# Patient Record
Sex: MALE | Race: BLACK OR AFRICAN AMERICAN | URBAN - METROPOLITAN AREA
[De-identification: names, ages, dates, MRNs, and addresses within clinical notes are randomized per-mention and may not be internally consistent; named-entity substitution may affect disease eponyms.]

---

## 2017-01-10 ENCOUNTER — OFFICE VISIT (OUTPATIENT)
Dept: FAMILY MEDICINE CLINIC | Facility: CLINIC | Age: 32
End: 2017-01-10

## 2017-01-10 VITALS
DIASTOLIC BLOOD PRESSURE: 64 MMHG | TEMPERATURE: 98 F | SYSTOLIC BLOOD PRESSURE: 108 MMHG | RESPIRATION RATE: 12 BRPM | OXYGEN SATURATION: 98 % | WEIGHT: 190 LBS | HEART RATE: 64 BPM

## 2017-01-10 DIAGNOSIS — M54.50 BILATERAL LOW BACK PAIN WITHOUT SCIATICA, UNSPECIFIED CHRONICITY: ICD-10-CM

## 2017-01-10 DIAGNOSIS — L64.9 MALE PATTERN BALDNESS: ICD-10-CM

## 2017-01-10 DIAGNOSIS — E78.2 MIXED HYPERLIPIDEMIA: ICD-10-CM

## 2017-01-10 DIAGNOSIS — R74.8 LOW SERUM HDL: Primary | ICD-10-CM

## 2017-01-10 DIAGNOSIS — B35.3 TINEA PEDIS OF LEFT FOOT: ICD-10-CM

## 2017-01-10 PROCEDURE — 99214 OFFICE O/P EST MOD 30 MIN: CPT | Performed by: FAMILY MEDICINE

## 2017-01-10 RX ORDER — OMEGA-3-ACID ETHYL ESTERS 1 G/1
2 CAPSULE, LIQUID FILLED ORAL 2 TIMES DAILY
Qty: 120 CAPSULE | Refills: 12 | Status: SHIPPED | OUTPATIENT
Start: 2017-01-10 | End: 2017-02-09

## 2017-01-10 RX ORDER — KETOCONAZOLE 20 MG/G
1 CREAM TOPICAL DAILY
Qty: 60 G | Refills: 12 | Status: SHIPPED | OUTPATIENT
Start: 2017-01-10

## 2017-01-10 RX ORDER — ROSUVASTATIN CALCIUM 20 MG/1
20 TABLET, COATED ORAL NIGHTLY
Qty: 30 TABLET | Refills: 3 | Status: SHIPPED | OUTPATIENT
Start: 2017-01-10 | End: 2017-05-12 | Stop reason: ALTCHOICE

## 2017-01-10 NOTE — PROGRESS NOTES
CHIEF COMPLAINT: Patient presents with: Follow - Up: labs        HPI:     Aiyana Sims is a 27year old male presents for review labs. Wants to discuss his male pattern baldness and recommendations for hair transplants or treatment.   Tried Rogaine w Take by mouth. Disp:  Rfl:    Linoleic Acid-Sunflower Oil (CLA OR) Take by mouth. Disp:  Rfl:    Omega-3-acid Ethyl Esters 1 G Oral Cap Take 2 capsules (2 g total) by mouth 2 (two) times daily.  With  meals Disp: 120 capsule Rfl: 12   Rosuvastatin Calcium 2 skin is dry. Toenails are clear. Anterior scalp with little hair growth mostly around the sides. No patchy alopecia with healthy-appearing scalp, has eyebrows and eyelashes-within normal limits  Neuro: AOx3. Sensation intact. LE DTR+2/4 bilateral and sym 11/28/2016   Neutrophil Absolute Value: 1.78(x10(3) uL)  Date: 11/28/2016   Lymphocyte Absolute Value: 2.27(x10(3) uL)  Date: 11/28/2016   Monocyte Absolute Value: 0.45(x10(3) uL)  Date: 11/28/2016   Eosinophil Absolute Value: 0.19(x10(3) uL)  Date: 11/28/ unspecified chronicity  Needs core strengthening and home exercise program  Start- Physical Therapy Referral - Kirk Quinteros to discuss a further recommendation since wants to play basketball.   No radicular result of today.      Problem List:   Patient Active Problem List:     Mixed hyperlipidemia      Imaging & Referrals:  OP REFERRAL TO [facility]  OP REFERRAL TO [facility]     1/10/2017  DO Jerrica Fatima

## 2017-01-10 NOTE — PATIENT INSTRUCTIONS
Please follow up with   [facility]  Med/Joint  Replacement/sports medicine   -sport meds  (ADULTS)- Office: [phone]    · Athletes feet.   · Thoroughly dry feet with a towel and in between toes after showering then appl These exercises are intended only as suggestions. Ask your provider or physical therapist to help you develop an exercise program. Check with your provider before starting the exercises. Ask your provider how many times a week you need to do the exercises. Partial curl: Lie on your back with your knees bent and your feet flat on the floor. Tighten your stomach muscles and flatten your back against the floor.  Tuck your chin to your chest. With your hands stretched out in front of you, curl your upper body for In addition to conditioning your back, you need to condition your whole body. Physical activities such as walking or swimming can help strengthen your back.  It is always best to check with your provider before you start any rigorous exercise program. Td 8. Use over-the-counter antifungal powders or sprays on your feet after exposure to high-risk environments, such as public showers, gyms, and locker rooms. This can help prevent future infections. Wearing appropriate shoes in these situations can help.   Pr 9. LDL (low-density lipoprotein) is known as \"bad cholesterol. \" It mainly carries cholesterol. It delivers this cholesterol to body cells. Excess LDL cholesterol will build up in artery walls. This increases your risk for heart disease and stroke.   10. H 14. Weight management. If you are overweight or obese, your health care provider will work with you to lose weight and lower your BMI (body mass index) to a normal or near-normal level. Making diet changes and increasing physical activity can help.   15. Ta Getting exercise  Regular exercise is a good way to help your body control cholesterol. Regular exercise can help in many ways. It can:  16. Raise your good cholesterol  17. Help lower your bad cholesterol  18. Let blood flow better through your body  19. Controlling stress   Learn ways to control stress. This will help you deal with stress in your home and work life. Controlling stress can greatly lower your risk of getting cardiovascular disease.   Making the most of medicines  Healthy eating and exercise · Adults who have diabetes. Or adults who are at higher risk of having a heart attack or stroke and have an LDL cholesterol level of 70 to 189 mg/dL  · Adults who are 24years old or older and have an LDL cholesterol level of 190 mg/dL or higher.   If you a

## 2017-02-20 ENCOUNTER — APPOINTMENT (OUTPATIENT)
Dept: LAB | Age: 32
End: 2017-02-20
Attending: FAMILY MEDICINE
Payer: COMMERCIAL

## 2017-02-20 DIAGNOSIS — E78.2 MIXED HYPERLIPIDEMIA: ICD-10-CM

## 2017-02-20 LAB
ALBUMIN SERPL-MCNC: 4.1 G/DL (ref 3.5–4.8)
ALP LIVER SERPL-CCNC: 64 U/L (ref 45–117)
ALT SERPL-CCNC: 20 U/L (ref 17–63)
AST SERPL-CCNC: 26 U/L (ref 15–41)
BILIRUB SERPL-MCNC: 1.2 MG/DL (ref 0.1–2)
BUN BLD-MCNC: 15 MG/DL (ref 8–20)
CALCIUM BLD-MCNC: 9.5 MG/DL (ref 8.3–10.3)
CHLORIDE: 107 MMOL/L (ref 101–111)
CHOLEST SMN-MCNC: 127 MG/DL (ref ?–200)
CO2: 29 MMOL/L (ref 22–32)
CREAT BLD-MCNC: 1.36 MG/DL (ref 0.7–1.3)
GLUCOSE BLD-MCNC: 90 MG/DL (ref 70–99)
HDLC SERPL-MCNC: 51 MG/DL (ref 45–?)
HDLC SERPL: 2.49 {RATIO} (ref ?–4.97)
LDLC SERPL CALC-MCNC: 60 MG/DL (ref ?–130)
M PROTEIN MFR SERPL ELPH: 8.1 G/DL (ref 6.1–8.3)
NONHDLC SERPL-MCNC: 76 MG/DL (ref ?–130)
POTASSIUM SERPL-SCNC: 3.9 MMOL/L (ref 3.6–5.1)
SODIUM SERPL-SCNC: 143 MMOL/L (ref 136–144)
TRIGLYCERIDES: 78 MG/DL (ref ?–150)
VLDL: 16 MG/DL (ref 5–40)

## 2017-02-20 PROCEDURE — 80061 LIPID PANEL: CPT

## 2017-02-20 PROCEDURE — 80053 COMPREHEN METABOLIC PANEL: CPT

## 2017-02-20 PROCEDURE — 36415 COLL VENOUS BLD VENIPUNCTURE: CPT

## 2017-02-24 ENCOUNTER — OFFICE VISIT (OUTPATIENT)
Dept: FAMILY MEDICINE CLINIC | Facility: CLINIC | Age: 32
End: 2017-02-24

## 2017-02-24 VITALS
RESPIRATION RATE: 16 BRPM | HEART RATE: 65 BPM | OXYGEN SATURATION: 98 % | WEIGHT: 187 LBS | SYSTOLIC BLOOD PRESSURE: 114 MMHG | DIASTOLIC BLOOD PRESSURE: 70 MMHG | TEMPERATURE: 98 F

## 2017-02-24 DIAGNOSIS — R79.89 ELEVATED SERUM CREATININE: ICD-10-CM

## 2017-02-24 DIAGNOSIS — E78.2 MIXED HYPERLIPIDEMIA: Primary | ICD-10-CM

## 2017-02-24 PROCEDURE — 99214 OFFICE O/P EST MOD 30 MIN: CPT | Performed by: FAMILY MEDICINE

## 2017-02-24 RX ORDER — OMEGA-3-ACID ETHYL ESTERS 1 G/1
2 CAPSULE, LIQUID FILLED ORAL 2 TIMES DAILY
Qty: 120 CAPSULE | Refills: 12 | Status: SHIPPED | OUTPATIENT
Start: 2017-02-24 | End: 2017-03-26

## 2017-02-24 NOTE — PROGRESS NOTES
CHIEF COMPLAINT: Patient presents with:  Lab Results: follow up         HPI:     Haskel Graft is a 27year old male presents for review labs. .Pt presents for recheck of hyperlipidemia.  Patient reports taking medications as instructed, no medication stated complaint: denies complaints , body aches  Pertinent positives and negatives noted in the the HPI. PHYSICAL EXAM:   /70 mmHg  Pulse 65  Temp(Src) 97.7 °F (36.5 °C) (Oral)  Resp 16  Wt 187 lb  SpO2 98%  Vital signs reviewed. Appears stated ag Date: 11/28/2016   TSH Value: 2.220(mIU/mL)  Date: 11/28/2016   25-Hydroxyvitamin D (Tot* Value: 31.5(ng/mL)  Date: 11/28/2016   WBC Value: 4.8(x10(3) uL)  Date: 11/28/2016   RBC Value: 4.77(x10(6)uL)  Date: 11/28/2016   HGB Value: 15.7(g/dL)  Date: 11/28/ and vegetables -6-7 servings daily with lean meats such as chicken and fish baked or grilled. - limited beef  1 x weekly and avoid fried foods, fatty foods or eat sparingly 3-4x monthly.   Drink 64 ounces of water daily  If new moderate joint pain or muscl

## 2017-02-24 NOTE — PATIENT INSTRUCTIONS
Controlling Your Cholesterol  Cholesterol is a waxy substance. It travels in your blood through the blood vessels. When you have high cholesterol, it builds up in the walls of the blood vessels. This makes the vessels narrower. Blood flow decreases.  You · Choose an activity you enjoy. Walking, swimming, and riding a bike are some good ways to be active. · Start at a level where you feel comfortable. Increase your time and pace a little each week.   · Work up to 40 minutes of moderate to high intensity phy · Cutting back on the amount of fat and cholesterol in your meals  · Eating less salt (sodium). This is especially important if you have high blood pressure.   · Eating more fresh vegetables and fruits  · Eating lean proteins such as fish, poultry, beans, a Smoking and other tobacco use can raise cholesterol and make it harder to control. Quitting is tough. But millions of people have given up tobacco for good. You can quit, too! Think about some of the reasons below to quit smoking.  Do any of them make you t Some people may need to take medicines called statins to control their cholesterol. This is in addition to eating a healthy diet and getting regular exercise. Statins can help you stay healthy. They can also help prevent a heart attack or stroke.  You may

## 2017-04-06 ENCOUNTER — TELEPHONE (OUTPATIENT)
Dept: FAMILY MEDICINE CLINIC | Facility: CLINIC | Age: 32
End: 2017-04-06

## 2017-05-12 ENCOUNTER — OFFICE VISIT (OUTPATIENT)
Dept: FAMILY MEDICINE CLINIC | Facility: CLINIC | Age: 32
End: 2017-05-12

## 2017-05-12 ENCOUNTER — APPOINTMENT (OUTPATIENT)
Dept: LAB | Age: 32
End: 2017-05-12
Attending: FAMILY MEDICINE
Payer: COMMERCIAL

## 2017-05-12 VITALS
SYSTOLIC BLOOD PRESSURE: 110 MMHG | WEIGHT: 190 LBS | HEART RATE: 59 BPM | OXYGEN SATURATION: 98 % | TEMPERATURE: 98 F | RESPIRATION RATE: 18 BRPM | DIASTOLIC BLOOD PRESSURE: 68 MMHG

## 2017-05-12 DIAGNOSIS — R79.89 ELEVATED SERUM CREATININE: ICD-10-CM

## 2017-05-12 DIAGNOSIS — E78.2 MIXED HYPERLIPIDEMIA: ICD-10-CM

## 2017-05-12 DIAGNOSIS — R79.89 ELEVATED SERUM CREATININE: Primary | ICD-10-CM

## 2017-05-12 PROCEDURE — 80053 COMPREHEN METABOLIC PANEL: CPT | Performed by: FAMILY MEDICINE

## 2017-05-12 PROCEDURE — 99213 OFFICE O/P EST LOW 20 MIN: CPT | Performed by: FAMILY MEDICINE

## 2017-05-12 PROCEDURE — 80061 LIPID PANEL: CPT | Performed by: FAMILY MEDICINE

## 2017-05-12 PROCEDURE — 36415 COLL VENOUS BLD VENIPUNCTURE: CPT | Performed by: FAMILY MEDICINE

## 2017-05-12 NOTE — PROGRESS NOTES
CHIEF COMPLAINT: No chief complaint on file. HPI:     Rj Goodman is a 34year old male presents for to discuss labs. Not redrawn creatinine that was elevated last visit but fasting.  Today fasting- no water or liquids or food since 10Pm last n atraumatic. Sclera clear and non icteric bilaterally. Oral pharynx clear without normal finding. Moist mucous membranes. Neck: supple. No adenopathy. No thyromegaly. Heart: RRR without S3 or S4 murmur .  Clear S1S2  Lungs: clear to auscultation bilate repeat labs with annual physical end of November 28      Meds This Visit:    No prescriptions requested or ordered in this encounter    Health Maintenance:  Influenza Vaccine(Season Ended) due on 09/01/2017  Annual Depression Screen due on 11/28/2017  Jenny

## 2017-05-16 ENCOUNTER — TELEPHONE (OUTPATIENT)
Dept: FAMILY MEDICINE CLINIC | Facility: CLINIC | Age: 32
End: 2017-05-16

## 2017-05-16 NOTE — TELEPHONE ENCOUNTER
LMOM to return my call. I advised patient to call [phone] along with office hours given.     Notes Recorded by Reyes Lebron DO on 5/15/2017 at 11:26 PM  -Verify patient taking  generic Crestor.  Prescribed 10 mg in January.  Triglycerides are elev

## 2017-05-19 NOTE — TELEPHONE ENCOUNTER
Spoke with patient regarding labs. Patient states he takes  10 mg  Crestor daily. Crestor 20 mg sent to pharmacy for . Patient to follow whole foods diet and exercise more to control cholesterol.      Notes Recorded by Alexi Rivera DO on 5/15/20

## (undated) NOTE — Clinical Note
02/24/2017    To Whom it Concerns:    Laverne Amaro. Was seen in my office today. This patient is on Rosuvastatin Calcium 20 mg by mouth every night. This medication is indicated for the patient to treat hyperlipidemia.  This medication was ordered 12/6/2

## (undated) NOTE — MR AVS SNAPSHOT
[facility] [place]  [address] [facility] [address] [place] [id number] [phone]               Thank you for choosing us for your health care visit with Boubacar Foy DO. We are glad to serve you and happy to provide you with this summary of your visit.   Pl Standing hamstring stretch: Place the heel of your leg on a stool about 15 inches high. Keep your knee straight. Lean forward, bending at the hips until you feel a mild stretch in the back of your thigh.  Make sure you do not roll your shoulders and bend at leg and pull that knee toward your chest. You will feel a stretch along the buttocks and possibly along the outside of your hip on the top leg. Hold this for 15 to 30 seconds. Repeat 3 times. Extension exercise: Lie face down on the floor for 5 minutes. Athlete’s foot (tinea pedis) is caused by a fungal infection in the skin. It affects the skin between the toes, causing cracks in the skin called fissures. It can also affect the bottom of the foot where it causes dry white scales and peeling of the skin. · Increasing redness or swelling of the foot  · Infection comes back soon after treatment  · Pus draining from cracks in the skin  Date Last Reviewed: 8/1/2016  © 2686-0207 The [address] [place] [address], [address] [place] [address] [place].  All rights re solid fats. Limit baked goods, processed meats, and fried foods. A diet that’s high in these fats increases your bad cholesterol. It's not enough to just cut back on foods containing cholesterol. · Eat about 2 servings of fish per week.  Most fish contain You can control your cholesterol through diet, exercise, weight management, quitting smoking, stress management, and taking your medicines right. These things can also lower your risk for cardiovascular disease.     Eating healthy  Your healthcare provider · Dancing  Managing your weight  If you are overweight or obese, your healthcare provider will work with you to help you lose weight and lower your BMI (body mass index).  Making diet changes and getting more physical activity can help. Changing your diet w or because your cholesterol numbers go down. Never stop taking your medicine unless your healthcare provider has told you it’s OK. · Ask your healthcare provider if you have any questions about your medicines.   High risk groups  Some people may need to ta substitute for professional medical care. Always follow your healthcare professional's instructions. Follow Up with Our Office     Return in about 1 month (around 2/10/2017) for discuss labs-draw labs week before.       Allergies as of Efrem 10, 2 Follow-up Instructions     Return in about 1 month (around 2/10/2017) for discuss labs-draw labs week before.          Referral Orders      Normal Orders This Visit    OP REFERRAL TO [facility] [[id number] CUSTOM]  Order #:  [id number] insurance requires you to obtain a managed care referral, please allow 3 working days from the date of this order for the referral to be processed. Please wait 3 working days to schedule your appointment unless notified.       [facility] in [facility] view more details from this visit by going to [email or web address]. If you've recently had a stay at the Hospital you can access your discharge instructions in [employer] by going to Visits < Admission Summaries.  If you've been to the Emergency Depar

## (undated) NOTE — MR AVS SNAPSHOT
[facility] [place]  [address] [facility] [id number] [phone]               Thank you for choosing us for your health care visit with Tobi Gold DO. We are glad to serve you and happy to provide you with this summary of your visit.   Pl in the bloodstream. If your total cholesterol is high, follow the steps below to help lower your total cholesterol level:  · Eat less unhealthy fat:  · Cut back on saturated fats and trans (also called hydrogenated) fats by selecting lean cuts of meat, low © 4881-6005 [address], [address] [place]. All rights reserved. This information is not intended as a substitute for professional medical care. Always follow your healthcare professional's instructions.         Viral Reyes You should do this for at least 3 to 4 days each week. A few examples of moderate to vigorous activity are:  · Walking at a brisk pace. This is about 3 to 4 miles per hour.   · Jogging or running  · Swimming or water aerobics  · Hiking  · Dancing  · Martial starting to take a medicine. Examples of side effects to watch for include muscle aches, weakness, blurred vision, rust-colored urine, yellowing of eyes or skin (jaundice), and headache.   · Don’t skip a dose or stop taking your medicine because you feel be you have. You may need to take more than one medicine to reach the cholesterol goals that you and your provider decide on. Date Last Reviewed: 10/1/2016  © 2294-9492 The [address] [place] [address], [address] [place], [place] [address].  All rights reserved discharge instructions in [employer] by going to Visits < Admission Summaries. If you've been to the Emergency Department or your doctor's office, you can view your past visit information in [employer] by going to Visits < Visit Summaries. [employer] questions?

## (undated) NOTE — MR AVS SNAPSHOT
[facility] [place]  [address] [facility] [id number] [phone]               Thank you for choosing us for your health care visit with Giancarlo Munoz DO. We are glad to serve you and happy to provide you with this summary of your visit.   Pl Summaries. If you've been to the Emergency Department or your doctor's office, you can view your past visit information in [employer] by going to Visits < Visit Summaries. [employer] questions? Call [phone] for help.   [employer] is NOT to be used for urge